# Patient Record
Sex: FEMALE | Race: ASIAN | URBAN - METROPOLITAN AREA
[De-identification: names, ages, dates, MRNs, and addresses within clinical notes are randomized per-mention and may not be internally consistent; named-entity substitution may affect disease eponyms.]

---

## 2019-08-01 ENCOUNTER — EMERGENCY (EMERGENCY)
Facility: HOSPITAL | Age: 29
LOS: 1 days | Discharge: HOME | End: 2019-08-01
Admitting: EMERGENCY MEDICINE
Payer: MEDICAID

## 2019-08-01 VITALS
SYSTOLIC BLOOD PRESSURE: 102 MMHG | TEMPERATURE: 98 F | RESPIRATION RATE: 17 BRPM | DIASTOLIC BLOOD PRESSURE: 68 MMHG | OXYGEN SATURATION: 100 % | HEART RATE: 58 BPM

## 2019-08-01 VITALS
OXYGEN SATURATION: 100 % | DIASTOLIC BLOOD PRESSURE: 63 MMHG | RESPIRATION RATE: 16 BRPM | HEART RATE: 72 BPM | TEMPERATURE: 98 F | SYSTOLIC BLOOD PRESSURE: 104 MMHG

## 2019-08-01 PROCEDURE — 99283 EMERGENCY DEPT VISIT LOW MDM: CPT

## 2019-08-01 PROCEDURE — 73562 X-RAY EXAM OF KNEE 3: CPT | Mod: 26,50

## 2019-08-01 RX ORDER — ACETAMINOPHEN 500 MG
975 TABLET ORAL ONCE
Refills: 0 | Status: COMPLETED | OUTPATIENT
Start: 2019-08-01 | End: 2019-08-01

## 2019-08-01 RX ADMIN — Medication 975 MILLIGRAM(S): at 11:38

## 2019-08-01 RX ADMIN — Medication 975 MILLIGRAM(S): at 10:39

## 2019-08-01 NOTE — ED STATDOCS - CLINICAL SUMMARY MEDICAL DECISION MAKING FREE TEXT BOX
28 y/o F w PMH gastritis, presents to the ED c/o b/l knee pain x5 days.  R>L, pain worse with walking and bending, likely ligamentous strain/meniscal etiology; xrays, tylenol, ortho follow up.

## 2019-08-01 NOTE — ED STATDOCS - NSIMPLEMENTINTERV_GEN_ALL_ED
Implemented All Universal Safety Interventions:  [place] to call system. Call bell, personal items and telephone within reach. Instruct patient to call for assistance. Room bathroom lighting operational. Non-slip footwear when patient is off stretcher. Physically safe environment: no spills, clutter or unnecessary equipment. Stretcher in lowest position, wheels locked, appropriate side rails in place.

## 2019-08-01 NOTE — ED STATDOCS - OBJECTIVE STATEMENT
30 y/o F w/ no pertinent PMH, presents to the ED c/o b/l knee pain x5 days. Pt states she works at [employer] and is on her feet all day. Pain is worse w/ walking and bending. Pt has been taking Tylenol for relief. Denies any fever, or any other acute complaints. NKDA. 28 y/o F w PMH gastritis, presents to the ED c/o b/l knee pain x5 days. Pt states she works at [employer] and is on her feet all day. Pain is worse w/ walking and bending. Pt has been taking Tylenol for relief without any improvement. Denies any fever, any other joint pains or any other acute complaints. NKDA.

## 2019-08-01 NOTE — ED ADULT TRIAGE NOTE - CHIEF COMPLAINT QUOTE
Pt states that she works long hours on her feet and both her knees have been paining her since Sunday.  Pt denies injury or direct trauma, states that they just started hurting her.  Pt denies PMH, denies daily medication

## 2019-08-01 NOTE — ED STATDOCS - MUSCULOSKELETAL FINDINGS, MLM
R knee w/ no swelling, no erythema. Mild ttp ot suprapatellar region. FROM. No ligamentous instability. L knee nontender w/ FROM. No instability

## 2019-08-01 NOTE — ED ADULT NURSE REASSESSMENT NOTE - NS ED NURSE REASSESS COMMENT FT1
Received pt to results waiting. Pt c/o bilateral knee pain since Sunday Right knee 10/10 left knee 6/10. Medicated with tylenol as ordered. Awaiting Xrays Pt informed re above.
received pt from [facility], pt pending x-ray results
pt d/c instructions given by Jonathon Bills, reports pain only when standing, denies numbness tingling, pt ambulatory family at bedside

## 2020-03-10 ENCOUNTER — APPOINTMENT (OUTPATIENT)
Dept: OBGYN | Facility: HOSPITAL | Age: 30
End: 2020-03-10

## 2020-03-10 ENCOUNTER — OUTPATIENT (OUTPATIENT)
Dept: OUTPATIENT SERVICES | Facility: HOSPITAL | Age: 30
LOS: 1 days | End: 2020-03-10

## 2020-03-10 ENCOUNTER — RESULT CHARGE (OUTPATIENT)
Age: 30
End: 2020-03-10

## 2020-03-10 DIAGNOSIS — Z00.00 ENCOUNTER FOR GENERAL ADULT MEDICAL EXAMINATION W/OUT ABNORMAL FINDINGS: ICD-10-CM

## 2020-03-10 LAB
HCG UR QL: POSITIVE
QUALITY CONTROL: YES

## 2020-03-11 DIAGNOSIS — Z32.00 ENCOUNTER FOR PREGNANCY TEST, RESULT UNKNOWN: ICD-10-CM

## 2020-03-11 PROBLEM — Z78.9 OTHER SPECIFIED HEALTH STATUS: Chronic | Status: ACTIVE | Noted: 2019-08-01

## 2020-04-07 ENCOUNTER — APPOINTMENT (OUTPATIENT)
Dept: OBGYN | Facility: HOSPITAL | Age: 30
End: 2020-04-07